# Patient Record
Sex: FEMALE | Race: WHITE | NOT HISPANIC OR LATINO | Employment: OTHER | URBAN - METROPOLITAN AREA
[De-identification: names, ages, dates, MRNs, and addresses within clinical notes are randomized per-mention and may not be internally consistent; named-entity substitution may affect disease eponyms.]

---

## 2023-02-18 PROBLEM — M43.16 SPONDYLOLISTHESIS AT L4-L5 LEVEL: Status: ACTIVE | Noted: 2023-02-18

## 2023-02-18 PROBLEM — D53.1 COMBINED B12 AND FOLATE DEFICIENCY ANEMIA: Status: ACTIVE | Noted: 2023-02-18

## 2023-02-18 PROBLEM — R42 VERTIGO: Status: ACTIVE | Noted: 2023-02-18

## 2023-02-18 PROBLEM — N39.0 UTI (URINARY TRACT INFECTION): Status: ACTIVE | Noted: 2023-02-18

## 2023-02-18 PROBLEM — H04.129 DRY EYE SYNDROME: Status: ACTIVE | Noted: 2023-02-18

## 2023-02-18 PROBLEM — G45.9 TIA (TRANSIENT ISCHEMIC ATTACK): Status: ACTIVE | Noted: 2023-02-18

## 2023-02-18 PROBLEM — M79.604 BILATERAL LEG PAIN: Status: ACTIVE | Noted: 2023-02-18

## 2023-02-18 PROBLEM — E66.9 CLASS 1 OBESITY WITH BODY MASS INDEX (BMI) OF 30.0 TO 30.9 IN ADULT: Status: ACTIVE | Noted: 2023-02-18

## 2023-02-18 PROBLEM — E55.9 MILD VITAMIN D DEFICIENCY: Status: ACTIVE | Noted: 2023-02-18

## 2023-02-18 PROBLEM — E66.811 CLASS 1 OBESITY WITH BODY MASS INDEX (BMI) OF 30.0 TO 30.9 IN ADULT: Status: ACTIVE | Noted: 2023-02-18

## 2023-02-18 PROBLEM — M79.7 FIBROMYALGIA: Status: ACTIVE | Noted: 2023-02-18

## 2023-02-18 PROBLEM — R53.81 MALAISE: Status: ACTIVE | Noted: 2023-02-18

## 2023-02-18 PROBLEM — E78.2 MIXED HYPERLIPIDEMIA: Status: ACTIVE | Noted: 2023-02-18

## 2023-02-18 PROBLEM — R30.0 DYSURIA: Status: ACTIVE | Noted: 2023-02-18

## 2023-02-18 PROBLEM — R09.02 HYPOXIA: Status: ACTIVE | Noted: 2023-02-18

## 2023-02-18 PROBLEM — R41.3 MEMORY LOSS: Status: ACTIVE | Noted: 2023-02-18

## 2023-02-18 PROBLEM — F41.8 DEPRESSION WITH ANXIETY: Status: ACTIVE | Noted: 2023-02-18

## 2023-02-18 PROBLEM — K21.9 ESOPHAGEAL REFLUX: Status: ACTIVE | Noted: 2023-02-18

## 2023-02-18 PROBLEM — I10 HYPERTENSION: Status: ACTIVE | Noted: 2023-02-18

## 2023-02-18 PROBLEM — E53.8 VITAMIN B 12 DEFICIENCY: Status: ACTIVE | Noted: 2023-02-18

## 2023-02-18 PROBLEM — M48.00 SPINAL STENOSIS: Status: ACTIVE | Noted: 2023-02-18

## 2023-02-18 PROBLEM — G62.9 NEUROPATHY: Status: ACTIVE | Noted: 2023-02-18

## 2023-02-18 PROBLEM — D64.9 ANEMIA: Status: ACTIVE | Noted: 2023-02-18

## 2023-02-18 PROBLEM — M79.605 BILATERAL LEG PAIN: Status: ACTIVE | Noted: 2023-02-18

## 2023-02-18 PROBLEM — R26.81 UNSTEADY GAIT: Status: ACTIVE | Noted: 2023-02-18

## 2023-02-18 PROBLEM — I25.10 CAD (CORONARY ARTERY DISEASE): Status: ACTIVE | Noted: 2023-02-18

## 2023-02-18 PROBLEM — K59.09 OTHER CONSTIPATION: Status: ACTIVE | Noted: 2023-02-18

## 2023-02-18 PROBLEM — R73.9 HYPERGLYCEMIA: Status: ACTIVE | Noted: 2023-02-18

## 2023-02-18 PROBLEM — R51.9 HEADACHE, UNSPECIFIED HEADACHE TYPE: Status: ACTIVE | Noted: 2023-02-18

## 2023-02-18 RX ORDER — ACETAMINOPHEN AND CODEINE PHOSPHATE 300; 30 MG/1; MG/1
1 TABLET ORAL DAILY PRN
COMMUNITY
Start: 2022-08-11 | End: 2023-04-05 | Stop reason: SINTOL

## 2023-02-18 RX ORDER — CITALOPRAM 10 MG/1
10 TABLET ORAL DAILY
COMMUNITY
Start: 2021-09-08 | End: 2023-12-20 | Stop reason: WASHOUT

## 2023-02-18 RX ORDER — ESOMEPRAZOLE MAGNESIUM 40 MG/1
40 CAPSULE, DELAYED RELEASE ORAL DAILY
COMMUNITY
Start: 2014-06-13 | End: 2023-12-20 | Stop reason: WASHOUT

## 2023-02-18 RX ORDER — METOPROLOL TARTRATE 25 MG/1
25 TABLET, FILM COATED ORAL 2 TIMES DAILY
COMMUNITY
Start: 2014-06-30 | End: 2023-07-10 | Stop reason: SDUPTHER

## 2023-02-18 RX ORDER — PEN NEEDLE, DIABETIC 30 GX3/16"
NEEDLE, DISPOSABLE MISCELLANEOUS
COMMUNITY
Start: 2016-07-18

## 2023-02-18 RX ORDER — LOSARTAN POTASSIUM 100 MG/1
100 TABLET ORAL DAILY
COMMUNITY
Start: 2016-07-18 | End: 2023-12-20 | Stop reason: SDUPTHER

## 2023-02-18 RX ORDER — ASPIRIN 81 MG/1
81 TABLET ORAL DAILY
COMMUNITY
Start: 2020-07-02 | End: 2023-12-20 | Stop reason: SDUPTHER

## 2023-02-18 RX ORDER — CYANOCOBALAMIN 1000 UG/ML
1000 INJECTION, SOLUTION INTRAMUSCULAR; SUBCUTANEOUS
COMMUNITY
Start: 2016-07-18

## 2023-02-18 RX ORDER — FOLIC ACID 1 MG/1
1 TABLET ORAL DAILY
COMMUNITY
Start: 2022-05-02 | End: 2023-12-26 | Stop reason: SDUPTHER

## 2023-02-18 RX ORDER — ERGOCALCIFEROL 1.25 MG/1
1.25 CAPSULE ORAL
COMMUNITY
Start: 2014-02-24 | End: 2023-04-05 | Stop reason: ALTCHOICE

## 2023-02-18 RX ORDER — AMLODIPINE BESYLATE 2.5 MG/1
2.5 TABLET ORAL NIGHTLY
COMMUNITY
Start: 2021-11-10 | End: 2023-11-03

## 2023-02-18 RX ORDER — ATORVASTATIN CALCIUM 20 MG/1
20 TABLET, FILM COATED ORAL DAILY
COMMUNITY
Start: 2021-06-07 | End: 2023-03-30 | Stop reason: SDUPTHER

## 2023-02-18 RX ORDER — GABAPENTIN 100 MG/1
100 CAPSULE ORAL 2 TIMES DAILY
COMMUNITY
Start: 2017-10-24 | End: 2023-12-20 | Stop reason: SDUPTHER

## 2023-02-18 RX ORDER — POLYVINYL ALCOHOL 14 MG/ML
1-2 SOLUTION/ DROPS OPHTHALMIC 2 TIMES DAILY
COMMUNITY
Start: 2018-12-27 | End: 2023-12-20 | Stop reason: SDUPTHER

## 2023-02-18 RX ORDER — NEEDLES, FILTER 19GX1 1/2"
NEEDLE, DISPOSABLE MISCELLANEOUS
COMMUNITY

## 2023-02-18 RX ORDER — TRAZODONE HYDROCHLORIDE 50 MG/1
50 TABLET ORAL NIGHTLY
COMMUNITY
Start: 2014-08-07 | End: 2023-04-05 | Stop reason: ALTCHOICE

## 2023-02-18 RX ORDER — CLOPIDOGREL BISULFATE 75 MG/1
75 TABLET ORAL DAILY
COMMUNITY
Start: 2020-02-14 | End: 2023-07-10 | Stop reason: SDUPTHER

## 2023-02-18 RX ORDER — PANTOPRAZOLE SODIUM 40 MG/1
40 TABLET, DELAYED RELEASE ORAL DAILY
COMMUNITY
Start: 2020-02-14 | End: 2023-12-20 | Stop reason: SDUPTHER

## 2023-02-18 RX ORDER — SYRINGE-NEEDLE,INSULIN,0.5 ML 27GX1/2"
SYRINGE, EMPTY DISPOSABLE MISCELLANEOUS
COMMUNITY

## 2023-03-06 ENCOUNTER — APPOINTMENT (OUTPATIENT)
Dept: PRIMARY CARE | Facility: CLINIC | Age: 83
End: 2023-03-06
Payer: COMMERCIAL

## 2023-03-30 DIAGNOSIS — E78.2 MIXED HYPERLIPIDEMIA: Primary | ICD-10-CM

## 2023-03-30 RX ORDER — ATORVASTATIN CALCIUM 20 MG/1
20 TABLET, FILM COATED ORAL DAILY
Qty: 30 TABLET | Refills: 3 | Status: SHIPPED | OUTPATIENT
Start: 2023-03-30 | End: 2023-07-10 | Stop reason: SDUPTHER

## 2023-04-05 ENCOUNTER — OFFICE VISIT (OUTPATIENT)
Dept: PRIMARY CARE | Facility: CLINIC | Age: 83
End: 2023-04-05
Payer: COMMERCIAL

## 2023-04-05 VITALS
SYSTOLIC BLOOD PRESSURE: 146 MMHG | HEART RATE: 62 BPM | BODY MASS INDEX: 29.25 KG/M2 | TEMPERATURE: 97 F | RESPIRATION RATE: 16 BRPM | DIASTOLIC BLOOD PRESSURE: 76 MMHG | WEIGHT: 149 LBS | HEIGHT: 60 IN

## 2023-04-05 DIAGNOSIS — E53.8 VITAMIN B 12 DEFICIENCY: Primary | ICD-10-CM

## 2023-04-05 DIAGNOSIS — Z00.00 ROUTINE GENERAL MEDICAL EXAMINATION AT HEALTH CARE FACILITY: ICD-10-CM

## 2023-04-05 DIAGNOSIS — E78.2 MIXED HYPERLIPIDEMIA: ICD-10-CM

## 2023-04-05 DIAGNOSIS — D50.9 IRON DEFICIENCY ANEMIA, UNSPECIFIED IRON DEFICIENCY ANEMIA TYPE: ICD-10-CM

## 2023-04-05 DIAGNOSIS — R73.9 HYPERGLYCEMIA: ICD-10-CM

## 2023-04-05 DIAGNOSIS — E55.9 MILD VITAMIN D DEFICIENCY: ICD-10-CM

## 2023-04-05 DIAGNOSIS — D53.1 COMBINED B12 AND FOLATE DEFICIENCY ANEMIA: ICD-10-CM

## 2023-04-05 PROCEDURE — 85025 COMPLETE CBC W/AUTO DIFF WBC: CPT | Performed by: INTERNAL MEDICINE

## 2023-04-05 PROCEDURE — 3077F SYST BP >= 140 MM HG: CPT | Performed by: INTERNAL MEDICINE

## 2023-04-05 PROCEDURE — 1157F ADVNC CARE PLAN IN RCRD: CPT | Performed by: INTERNAL MEDICINE

## 2023-04-05 PROCEDURE — 80061 LIPID PANEL: CPT | Performed by: INTERNAL MEDICINE

## 2023-04-05 PROCEDURE — 1159F MED LIST DOCD IN RCRD: CPT | Performed by: INTERNAL MEDICINE

## 2023-04-05 PROCEDURE — 82306 VITAMIN D 25 HYDROXY: CPT | Performed by: INTERNAL MEDICINE

## 2023-04-05 PROCEDURE — 1036F TOBACCO NON-USER: CPT | Performed by: INTERNAL MEDICINE

## 2023-04-05 PROCEDURE — G0439 PPPS, SUBSEQ VISIT: HCPCS | Performed by: INTERNAL MEDICINE

## 2023-04-05 PROCEDURE — 1170F FXNL STATUS ASSESSED: CPT | Performed by: INTERNAL MEDICINE

## 2023-04-05 PROCEDURE — 36415 COLL VENOUS BLD VENIPUNCTURE: CPT | Performed by: INTERNAL MEDICINE

## 2023-04-05 PROCEDURE — 99213 OFFICE O/P EST LOW 20 MIN: CPT | Performed by: INTERNAL MEDICINE

## 2023-04-05 PROCEDURE — 3078F DIAST BP <80 MM HG: CPT | Performed by: INTERNAL MEDICINE

## 2023-04-05 PROCEDURE — 84443 ASSAY THYROID STIM HORMONE: CPT | Performed by: INTERNAL MEDICINE

## 2023-04-05 PROCEDURE — 80053 COMPREHEN METABOLIC PANEL: CPT | Performed by: INTERNAL MEDICINE

## 2023-04-05 PROCEDURE — 82746 ASSAY OF FOLIC ACID SERUM: CPT | Performed by: INTERNAL MEDICINE

## 2023-04-05 PROCEDURE — 82607 VITAMIN B-12: CPT | Performed by: INTERNAL MEDICINE

## 2023-04-05 PROCEDURE — 83036 HEMOGLOBIN GLYCOSYLATED A1C: CPT | Performed by: INTERNAL MEDICINE

## 2023-04-05 ASSESSMENT — ACTIVITIES OF DAILY LIVING (ADL)
NEEDS ASSISTANCE WITH FOOD: SET UP OF PLATE
GROOMING: NEEDS ASSISTANCE
JUDGMENT_ADEQUATE_SAFELY_COMPLETE_DAILY_ACTIVITIES: NO
HEARING - RIGHT EAR: FUNCTIONAL
GROCERY SHOPPING: TOTAL CARE
ADEQUATE_TO_COMPLETE_ADL: YES
USING TELEPHONE: NEEDS ASSISTANCE
HEARING - LEFT EAR: FUNCTIONAL
ADEQUATE_TO_COMPLETE_ADL: YES
JUDGMENT_ADEQUATE_SAFELY_COMPLETE_DAILY_ACTIVITIES: NO
FEEDING YOURSELF: INDEPENDENT
TOILETING: NEEDS ASSISTANCE
TAKING MEDICATION: TOTAL CARE
DRESSING YOURSELF: INDEPENDENT
DRESSING: INDEPENDENT
MANAGING FINANCES: TOTAL CARE
EATING: NEEDS ASSISTANCE
PREPARING MEALS: TOTAL CARE
BATHING: NEEDS ASSISTANCE
BATHING: NEEDS ASSISTANCE
WALKS IN HOME: INDEPENDENT
DOING HOUSEWORK: TOTAL CARE
PATIENT'S MEMORY ADEQUATE TO SAFELY COMPLETE DAILY ACTIVITIES?: NO
PILL BOX USED: YES
ASSISTIVE_DEVICE: OTHER (COMMENT);CANE
FEEDING: INDEPENDENT
USING TRANSPORTATION: TOTAL CARE
TOILETING: NEEDS ASSISTANCE

## 2023-04-05 ASSESSMENT — PATIENT HEALTH QUESTIONNAIRE - PHQ9
SUM OF ALL RESPONSES TO PHQ9 QUESTIONS 1 AND 2: 2
2. FEELING DOWN, DEPRESSED OR HOPELESS: SEVERAL DAYS
10. IF YOU CHECKED OFF ANY PROBLEMS, HOW DIFFICULT HAVE THESE PROBLEMS MADE IT FOR YOU TO DO YOUR WORK, TAKE CARE OF THINGS AT HOME, OR GET ALONG WITH OTHER PEOPLE: SOMEWHAT DIFFICULT
1. LITTLE INTEREST OR PLEASURE IN DOING THINGS: SEVERAL DAYS

## 2023-04-05 ASSESSMENT — ENCOUNTER SYMPTOMS
ARTHRALGIAS: 1
COUGH: 0
SLEEP DISTURBANCE: 1
DIZZINESS: 1
CONSTIPATION: 0
LOSS OF SENSATION IN FEET: 0
NECK PAIN: 1
ACTIVITY CHANGE: 1
WEAKNESS: 1
BACK PAIN: 1
APPETITE CHANGE: 1
HEADACHES: 1
NERVOUS/ANXIOUS: 1
MYALGIAS: 1
CHEST TIGHTNESS: 1
SHORTNESS OF BREATH: 1
DEPRESSION: 1
OCCASIONAL FEELINGS OF UNSTEADINESS: 1

## 2023-04-05 ASSESSMENT — ANXIETY QUESTIONNAIRES
6. BECOMING EASILY ANNOYED OR IRRITABLE: SEVERAL DAYS
4. TROUBLE RELAXING: NOT AT ALL
1. FEELING NERVOUS, ANXIOUS, OR ON EDGE: NOT AT ALL
2. NOT BEING ABLE TO STOP OR CONTROL WORRYING: NOT AT ALL
GAD7 TOTAL SCORE: 3
3. WORRYING TOO MUCH ABOUT DIFFERENT THINGS: NOT AT ALL
7. FEELING AFRAID AS IF SOMETHING AWFUL MIGHT HAPPEN: SEVERAL DAYS
5. BEING SO RESTLESS THAT IT IS HARD TO SIT STILL: SEVERAL DAYS

## 2023-04-05 ASSESSMENT — PAIN SCALES - GENERAL: PAINLEVEL: 8

## 2023-04-05 ASSESSMENT — COLUMBIA-SUICIDE SEVERITY RATING SCALE - C-SSRS
1. IN THE PAST MONTH, HAVE YOU WISHED YOU WERE DEAD OR WISHED YOU COULD GO TO SLEEP AND NOT WAKE UP?: NO
2. HAVE YOU ACTUALLY HAD ANY THOUGHTS OF KILLING YOURSELF?: NO
6. HAVE YOU EVER DONE ANYTHING, STARTED TO DO ANYTHING, OR PREPARED TO DO ANYTHING TO END YOUR LIFE?: NO

## 2023-04-05 NOTE — PROGRESS NOTES
Subjective     Patient is presented for follow up.  Has malaise, complaining of low back pain.  Weak.  Has insomnia.  Patient is seen with her  present.    HPI    This is an 82 years old Botswanan speaking lady with medical history significant for hypertension, hyper lipidemia, vitamin D. deficiency, degenerative joint disease, low back pain, severe depression with anxiety, headaches, arthralgia, insomnia, memory loss, low back pain, fibromyalgia, gastroesophageal reflux disease, syncopal episode, H of R sided pleural effusion, s/p hospitalization for Acute NSTMI 1/2020 due to occlusion of LAD with NATALIE placement , s/p bilateral cataract surgery, S/p hospitalization for COVID -19 illness 7/6/2022 to 7/9/2022.  Patient  is presented for  evaluation and treatment of the above complaints.  She is in the room with her .  According to her, patient has been weak.  Has dizziness, lightheadedness episodes.  Has low back pain, arthralgia.  Has insomnia.  Taking medications as directed.  Has occasional chest pain.  Stated, that blood pressure is stable now.   Patient has issues with memory.  Forgetful.     Has occasional constipations.  Using bedside commode.  Discussed with patient about exercise, not so active.     Has been having low energy, fatigue and malaise.  Stated, that has been having some chest discomfort.  Followed by Dr Kamari Rogers at [facility].     Followed by the psychiatry .    Review of Systems   Constitutional:  Positive for activity change and appetite change.   Respiratory:  Positive for chest tightness and shortness of breath. Negative for cough.    Cardiovascular:  Negative for leg swelling.   Gastrointestinal:  Negative for constipation.   Musculoskeletal:  Positive for arthralgias, back pain, myalgias and neck pain.   Neurological:  Positive for dizziness, weakness and headaches.   Psychiatric/Behavioral:  Positive for sleep disturbance. The patient is nervous/anxious.        Objective         Vitals:    04/05/23 1151   BP: 146/76   Pulse: 62   Resp: 16   Temp: 36.1 °C (97 °F)        Physical Exam  Constitutional:       Appearance: Normal appearance.   HENT:      Head: Normocephalic and atraumatic.      Nose: Nose normal.      Mouth/Throat:      Mouth: Mucous membranes are moist.   Eyes:      Extraocular Movements: Extraocular movements intact.      Pupils: Pupils are equal, round, and reactive to light.   Cardiovascular:      Rate and Rhythm: Normal rate and regular rhythm.   Pulmonary:      Effort: Pulmonary effort is normal.      Breath sounds: Normal breath sounds.   Abdominal:      Palpations: Abdomen is soft.   Musculoskeletal:      Cervical back: Normal range of motion.   Skin:     General: Skin is warm and dry.   Neurological:      General: No focal deficit present.      Mental Status: She is alert and oriented to person, place, and time.      Motor: Weakness present.      Gait: Gait abnormal.   Psychiatric:         Mood and Affect: Mood normal.         Behavior: Behavior normal.       Diagnoses and all orders for this visit:  Vitamin B 12 deficiency (Primary)  -     Vitamin B12  Combined B12 and folate deficiency anemia  -     Folate  -     Vitamin B12  Hyperglycemia  -     Hemoglobin A1C  Mixed hyperlipidemia  -     Comprehensive Metabolic Panel  -     Lipid Panel  -     Thyroid Stimulating Hormone  Mild vitamin D deficiency  -     Vitamin D 1,25 Dihydroxy  Iron deficiency anemia, unspecified iron deficiency anemia type  -     CBC     -Fibromyalgia.  Generalized pain.  Patient was advised about importance of exercise, moving.    -Anxiety, depression.  Taking escitalopram, trazodone.  Follow-up with psychiatry.  Stable.    -H of COVID -19 illness, s/p hospitalization 7/6/2022 to 7/9/2022.  Patient was admitted to the main hospital with acute respiratory failure due to COVID-19, episodes of diarrhea, shortness of breath, UTI, acute kidney injury, dehydrated.  Patient was treated with  remdesivir, dexamethasone.  She is unvaccinated.     -Degenerative joint disease.  Low back pain.  Take Tylenol as needed.  Continue to take gabapentin 100 mg twice a day.    -HTN.  WEll controlled now.  Currently taking amlodipine, losartan metoprolol.  Avoid salt, exercise.    -CAD.  H of Acute NSTEMI due to 90 % LAD occlusion.  Followed by Dr Padilla at [facility].  Last 2D Echo 1/2020 normal left ventricular systolic function..  Grade 1 left ventricular diastolic dysfunction.  Cardiac cath 1/2020 severe stenosis of the proximal LAD.  Mild diffuse coronary disease of LCx and RCA.  S/p NATALIE placement 1/2020.  Stable now, no chest pain.  Optimize medical therapy.    - Ascending aortic aneurysm.  Surveillance CT scan.    -Headaches.  Take gabapentin 100 mg twice a day.    - Stable orbital meningioma.  Stable.    =Mild Vit D deficiency.  Take Vit D as directed.     - Vitamin B-12 deficiency.  Continue to take vitamin B 12 monthly.    - Health maintenance.  Medicare wellness exam is  today.  Declined vaccinations.    -Overweight with BMI 29.10  Keep your weight, continue well-balanced diet.  Ideal BMI is 25.    -Hyperglycemia.  We will obtain hemoglobin A1c.  Avoid sweets, carbohydrates.     Follow up in 1 month.            Romana Morse MD   Patient was identified as a fall risk. Risk prevention instructions provided.

## 2023-04-05 NOTE — PATIENT INSTRUCTIONS

## 2023-04-10 RX ORDER — ERGOCALCIFEROL 1.25 MG/1
50000 CAPSULE ORAL
Qty: 4 CAPSULE | Refills: 1 | Status: SHIPPED | OUTPATIENT
Start: 2023-04-10 | End: 2023-06-05

## 2023-04-10 NOTE — PROGRESS NOTES
D/w patient about BW results.  Take Vit D 98490 weekly.  Watch your carbohydrate intake.  Hemoglobin A1c 6.1.  Current  therapy with atorvastatin.

## 2023-08-11 PROCEDURE — G0179 MD RECERTIFICATION HHA PT: HCPCS | Performed by: INTERNAL MEDICINE

## 2023-10-17 DIAGNOSIS — I25.10 CORONARY ARTERY DISEASE INVOLVING NATIVE CORONARY ARTERY OF NATIVE HEART, UNSPECIFIED WHETHER ANGINA PRESENT: ICD-10-CM

## 2023-10-17 RX ORDER — CLOPIDOGREL BISULFATE 75 MG/1
75 TABLET ORAL DAILY
Qty: 90 TABLET | Refills: 0 | Status: SHIPPED | OUTPATIENT
Start: 2023-10-17 | End: 2023-12-20 | Stop reason: SDUPTHER

## 2023-11-02 DIAGNOSIS — I10 PRIMARY HYPERTENSION: Primary | ICD-10-CM

## 2023-11-03 RX ORDER — AMLODIPINE BESYLATE 2.5 MG/1
2.5 TABLET ORAL NIGHTLY
Qty: 90 TABLET | Refills: 3 | Status: SHIPPED | OUTPATIENT
Start: 2023-11-03 | End: 2023-12-20 | Stop reason: SDUPTHER

## 2023-12-20 ENCOUNTER — OFFICE VISIT (OUTPATIENT)
Dept: PRIMARY CARE | Facility: CLINIC | Age: 83
End: 2023-12-20
Payer: COMMERCIAL

## 2023-12-20 VITALS
RESPIRATION RATE: 16 BRPM | WEIGHT: 144 LBS | TEMPERATURE: 96.9 F | HEART RATE: 62 BPM | HEIGHT: 60 IN | SYSTOLIC BLOOD PRESSURE: 138 MMHG | BODY MASS INDEX: 28.27 KG/M2 | DIASTOLIC BLOOD PRESSURE: 76 MMHG

## 2023-12-20 DIAGNOSIS — M79.7 FIBROMYALGIA: ICD-10-CM

## 2023-12-20 DIAGNOSIS — E53.8 VITAMIN B 12 DEFICIENCY: ICD-10-CM

## 2023-12-20 DIAGNOSIS — E55.9 MILD VITAMIN D DEFICIENCY: ICD-10-CM

## 2023-12-20 DIAGNOSIS — Z78.0 ASYMPTOMATIC MENOPAUSAL STATE: Primary | ICD-10-CM

## 2023-12-20 DIAGNOSIS — H04.123 DRY EYE SYNDROME OF BOTH EYES: ICD-10-CM

## 2023-12-20 DIAGNOSIS — E78.2 MIXED HYPERLIPIDEMIA: ICD-10-CM

## 2023-12-20 DIAGNOSIS — I25.10 CORONARY ARTERY DISEASE INVOLVING NATIVE CORONARY ARTERY OF NATIVE HEART, UNSPECIFIED WHETHER ANGINA PRESENT: ICD-10-CM

## 2023-12-20 DIAGNOSIS — K21.9 GASTROESOPHAGEAL REFLUX DISEASE, UNSPECIFIED WHETHER ESOPHAGITIS PRESENT: ICD-10-CM

## 2023-12-20 DIAGNOSIS — R53.81 MALAISE AND FATIGUE: ICD-10-CM

## 2023-12-20 DIAGNOSIS — D50.9 IRON DEFICIENCY ANEMIA, UNSPECIFIED IRON DEFICIENCY ANEMIA TYPE: ICD-10-CM

## 2023-12-20 DIAGNOSIS — R53.83 MALAISE AND FATIGUE: ICD-10-CM

## 2023-12-20 DIAGNOSIS — E53.8 FOLATE DEFICIENCY: ICD-10-CM

## 2023-12-20 DIAGNOSIS — R73.9 HYPERGLYCEMIA: ICD-10-CM

## 2023-12-20 DIAGNOSIS — I10 PRIMARY HYPERTENSION: ICD-10-CM

## 2023-12-20 PROCEDURE — 1036F TOBACCO NON-USER: CPT | Performed by: INTERNAL MEDICINE

## 2023-12-20 PROCEDURE — 82746 ASSAY OF FOLIC ACID SERUM: CPT | Performed by: INTERNAL MEDICINE

## 2023-12-20 PROCEDURE — 82306 VITAMIN D 25 HYDROXY: CPT | Performed by: INTERNAL MEDICINE

## 2023-12-20 PROCEDURE — 1160F RVW MEDS BY RX/DR IN RCRD: CPT | Performed by: INTERNAL MEDICINE

## 2023-12-20 PROCEDURE — 3075F SYST BP GE 130 - 139MM HG: CPT | Performed by: INTERNAL MEDICINE

## 2023-12-20 PROCEDURE — 80061 LIPID PANEL: CPT | Performed by: INTERNAL MEDICINE

## 2023-12-20 PROCEDURE — 1159F MED LIST DOCD IN RCRD: CPT | Performed by: INTERNAL MEDICINE

## 2023-12-20 PROCEDURE — 85025 COMPLETE CBC W/AUTO DIFF WBC: CPT | Performed by: INTERNAL MEDICINE

## 2023-12-20 PROCEDURE — 1125F AMNT PAIN NOTED PAIN PRSNT: CPT | Performed by: INTERNAL MEDICINE

## 2023-12-20 PROCEDURE — 82607 VITAMIN B-12: CPT | Performed by: INTERNAL MEDICINE

## 2023-12-20 PROCEDURE — 84443 ASSAY THYROID STIM HORMONE: CPT | Performed by: INTERNAL MEDICINE

## 2023-12-20 PROCEDURE — 3078F DIAST BP <80 MM HG: CPT | Performed by: INTERNAL MEDICINE

## 2023-12-20 PROCEDURE — 83036 HEMOGLOBIN GLYCOSYLATED A1C: CPT | Performed by: INTERNAL MEDICINE

## 2023-12-20 PROCEDURE — 80053 COMPREHEN METABOLIC PANEL: CPT | Performed by: INTERNAL MEDICINE

## 2023-12-20 PROCEDURE — 99213 OFFICE O/P EST LOW 20 MIN: CPT | Performed by: INTERNAL MEDICINE

## 2023-12-20 RX ORDER — ASPIRIN 81 MG/1
81 TABLET ORAL DAILY
Qty: 90 TABLET | Refills: 2 | Status: SHIPPED | OUTPATIENT
Start: 2023-12-20 | End: 2024-03-19

## 2023-12-20 RX ORDER — ALPRAZOLAM 0.25 MG/1
0.12 TABLET ORAL NIGHTLY
COMMUNITY
Start: 2023-08-04

## 2023-12-20 RX ORDER — AMLODIPINE BESYLATE 2.5 MG/1
2.5 TABLET ORAL NIGHTLY
Qty: 90 TABLET | Refills: 3 | Status: SHIPPED | OUTPATIENT
Start: 2023-12-20

## 2023-12-20 RX ORDER — PANTOPRAZOLE SODIUM 40 MG/1
40 TABLET, DELAYED RELEASE ORAL DAILY
Qty: 90 TABLET | Refills: 3 | Status: SHIPPED | OUTPATIENT
Start: 2023-12-20 | End: 2024-04-04 | Stop reason: SDUPTHER

## 2023-12-20 RX ORDER — ATORVASTATIN CALCIUM 20 MG/1
20 TABLET, FILM COATED ORAL DAILY
Qty: 90 TABLET | Refills: 3 | Status: SHIPPED | OUTPATIENT
Start: 2023-12-20 | End: 2023-12-26 | Stop reason: WASHOUT

## 2023-12-20 RX ORDER — METOPROLOL TARTRATE 25 MG/1
25 TABLET, FILM COATED ORAL 2 TIMES DAILY
Qty: 180 TABLET | Refills: 3 | Status: SHIPPED | OUTPATIENT
Start: 2023-12-20

## 2023-12-20 RX ORDER — LOSARTAN POTASSIUM 100 MG/1
100 TABLET ORAL DAILY
Qty: 90 TABLET | Refills: 3 | Status: SHIPPED | OUTPATIENT
Start: 2023-12-20 | End: 2024-04-04 | Stop reason: SDUPTHER

## 2023-12-20 RX ORDER — GABAPENTIN 100 MG/1
100 CAPSULE ORAL 2 TIMES DAILY
Qty: 180 CAPSULE | Refills: 3 | Status: SHIPPED | OUTPATIENT
Start: 2023-12-20 | End: 2024-03-19

## 2023-12-20 RX ORDER — SERTRALINE HYDROCHLORIDE 25 MG/1
25 TABLET, FILM COATED ORAL DAILY
COMMUNITY
Start: 2023-07-30

## 2023-12-20 RX ORDER — CLOPIDOGREL BISULFATE 75 MG/1
75 TABLET ORAL DAILY
Qty: 90 TABLET | Refills: 3 | Status: SHIPPED | OUTPATIENT
Start: 2023-12-20

## 2023-12-20 RX ORDER — POLYVINYL ALCOHOL 14 MG/ML
1-2 SOLUTION/ DROPS OPHTHALMIC 2 TIMES DAILY
Qty: 5 ML | Refills: 3 | Status: SHIPPED | OUTPATIENT
Start: 2023-12-20

## 2023-12-20 ASSESSMENT — ENCOUNTER SYMPTOMS
HEADACHES: 1
ARTHRALGIAS: 1
DIZZINESS: 1
BACK PAIN: 1
NERVOUS/ANXIOUS: 1
UNEXPECTED WEIGHT CHANGE: 1
SLEEP DISTURBANCE: 1
LIGHT-HEADEDNESS: 1
FATIGUE: 1
APPETITE CHANGE: 1
ACTIVITY CHANGE: 1

## 2023-12-20 ASSESSMENT — PAIN SCALES - GENERAL: PAINLEVEL: 8

## 2023-12-20 NOTE — PROGRESS NOTES
Subjective    Ragini Butler is a 83 y.o. female who presents for  follow up.  Weak.  Has poor sleep.  Has continuous headaches.    HPI    This is an 83 years old Turkmen speaking lady with medical history significant for hypertension, hyper lipidemia, vitamin D. deficiency, degenerative joint disease, low back pain, severe depression with anxiety, headaches, arthralgia, insomnia, memory loss, low back pain, fibromyalgia, gastroesophageal reflux disease, syncopal episode, H of R sided pleural effusion, s/p hospitalization for Acute NSTMI 1/2020 due to occlusion of LAD with NATALIE placement , s/p bilateral cataract surgery, S/p hospitalization for COVID -19 illness 7/6/2022 to 7/9/2022.  Patient  is presented for  evaluation and treatment of the above complaints.  She is in the room with her .  Has headaches.  According to her, patient has been weak.  Has dizziness, lightheadedness episodes.  Has low back pain, arthralgia.  Has insomnia.  Taking medications as directed.  Has occasional chest pain.  Stated, that blood pressure is stable now.   Patient has issues with memory.  Forgetful.     Has occasional constipations.  Using bedside commode.  Discussed with patient about exercise, not so active.     Has been having low energy, fatigue and malaise.  Stated, that has been having some chest discomfort.  Followed by Dr Kamari Rogers at [facility].     Followed by the psychiatry .     Review of Systems   Constitutional:  Positive for activity change, appetite change, fatigue and unexpected weight change.   Musculoskeletal:  Positive for arthralgias and back pain.   Neurological:  Positive for dizziness, light-headedness and headaches.   Psychiatric/Behavioral:  Positive for sleep disturbance. The patient is nervous/anxious.        Objective        Vitals:    12/20/23 1322   BP: 138/76   Pulse: 62   Resp: 16   Temp: 36.1 °C (96.9 °F)        Physical Exam  HENT:      Head: Normocephalic.      Nose: Nose normal.       Mouth/Throat:      Mouth: Mucous membranes are dry.   Cardiovascular:      Pulses: Normal pulses.   Pulmonary:      Effort: Pulmonary effort is normal.      Breath sounds: Normal breath sounds.   Musculoskeletal:         General: Normal range of motion.   Neurological:      General: No focal deficit present.      Mental Status: She is alert. Mental status is at baseline.   Psychiatric:         Mood and Affect: Mood normal.         Thought Content: Thought content normal.       Diagnoses and all orders for this visit:  Asymptomatic menopausal state (Primary)  -     XR DEXA bone density; Future  Mild vitamin D deficiency  -     Vitamin D 25-Hydroxy,Total (for eval of Vitamin D levels)  Hyperglycemia  -     Hemoglobin A1C  Vitamin B 12 deficiency  -     Vitamin B12  Iron deficiency anemia, unspecified iron deficiency anemia type  -     CBC  Mixed hyperlipidemia  -     Comprehensive Metabolic Panel  -     Lipid Panel  -     atorvastatin (Lipitor) 20 mg tablet; Take 1 tablet (20 mg) by mouth once daily.  -     aspirin 81 mg EC tablet; Take 1 tablet (81 mg) by mouth once daily.  Folate deficiency  -     Folate  Malaise and fatigue  -     Thyroid Stimulating Hormone  Primary hypertension  -     metoprolol tartrate (Lopressor) 25 mg tablet; Take 1 tablet (25 mg) by mouth 2 times a day.  -     losartan (Cozaar) 100 mg tablet; Take 1 tablet (100 mg) by mouth once daily.  -     amLODIPine (Norvasc) 2.5 mg tablet; Take 1 tablet (2.5 mg) by mouth once daily at bedtime.  Gastroesophageal reflux disease, unspecified whether esophagitis present  -     pantoprazole (ProtoNix) 40 mg EC tablet; Take 1 tablet (40 mg) by mouth once daily.  Dry eye syndrome of both eyes  -     polyvinyl alcohol (Liquifilm Tears) 1.4 % ophthalmic solution; Administer 1-2 drops into both eyes 2 times a day.  Fibromyalgia  -     gabapentin (Neurontin) 100 mg capsule; Take 1 capsule (100 mg) by mouth 2 times a day.  Coronary artery disease involving native  coronary artery of native heart, unspecified whether angina present  -     clopidogrel (Plavix) 75 mg tablet; Take 1 tablet (75 mg) by mouth once daily.     -Anxiety, depression.  Taking  Sertraline, Xanax.  Follow-up with psychiatry.  Stable.    Fibromyalgia.  Generalized pain.  Patient was advised about importance of exercise, moving.     -H of COVID -19 illness, s/p hospitalization 7/6/2022 to 7/9/2022.  Patient was admitted to the main hospital with acute respiratory failure due to COVID-19, episodes of diarrhea, shortness of breath, UTI, acute kidney injury, dehydrated.  Patient was treated with remdesivir, dexamethasone.  She is unvaccinated.     -Degenerative joint disease.  Low back pain.  Take Tylenol as needed.  Continue to take gabapentin 100 mg twice a day.     -HTN.  WEll controlled now.  Currently taking amlodipine, losartan metoprolol.  Avoid salt, exercise.     -CAD.  H of Acute NSTEMI due to 90 % LAD occlusion.  Followed by Dr Padilla at [facility].  Last 2D Echo 1/2020 normal left ventricular systolic function..  Grade 1 left ventricular diastolic dysfunction.  Cardiac cath 1/2020 severe stenosis of the proximal LAD.  Mild diffuse coronary disease of LCx and RCA.  S/p NATALIE placement 1/2020.  Stable now, no chest pain.  Optimize medical therapy.     - Ascending aortic aneurysm.  Surveillance CT scan.     -Headaches.  Take gabapentin 100 mg twice a day.     - Stable orbital meningioma.  Stable.     =Mild Vit D deficiency.  Take Vit D as directed.      - Vitamin B-12 deficiency.  Continue to take vitamin B 12 monthly.     - Health maintenance.  Medicare wellness exam is  today.  Declined vaccinations.     -Overweight with BMI 28.12.  Keep your weight, continue well-balanced diet.  Ideal BMI is 25.     -Hyperglycemia.  We will obtain hemoglobin A1c.  Avoid sweets, carbohydrates.      Romana Morse MD

## 2023-12-26 DIAGNOSIS — E55.9 MILD VITAMIN D DEFICIENCY: ICD-10-CM

## 2023-12-26 DIAGNOSIS — E53.8 FOLIC ACID DEFICIENCY: Primary | ICD-10-CM

## 2023-12-26 DIAGNOSIS — E78.2 MIXED HYPERLIPIDEMIA: ICD-10-CM

## 2023-12-26 RX ORDER — ERGOCALCIFEROL 1.25 MG/1
50000 CAPSULE ORAL
Qty: 4 CAPSULE | Refills: 2 | Status: SHIPPED | OUTPATIENT
Start: 2023-12-26 | End: 2024-03-19

## 2023-12-26 RX ORDER — FOLIC ACID 1 MG/1
1 TABLET ORAL DAILY
Qty: 90 TABLET | Refills: 3 | Status: SHIPPED | OUTPATIENT
Start: 2023-12-26 | End: 2023-12-29 | Stop reason: SDUPTHER

## 2023-12-26 RX ORDER — ATORVASTATIN CALCIUM 40 MG/1
40 TABLET, FILM COATED ORAL DAILY
Qty: 90 TABLET | Refills: 2 | Status: SHIPPED | OUTPATIENT
Start: 2023-12-26 | End: 2024-04-04 | Stop reason: WASHOUT

## 2023-12-29 ENCOUNTER — TELEPHONE (OUTPATIENT)
Dept: PRIMARY CARE | Facility: CLINIC | Age: 83
End: 2023-12-29
Payer: COMMERCIAL

## 2023-12-29 DIAGNOSIS — E53.8 FOLATE DEFICIENCY: Primary | ICD-10-CM

## 2023-12-29 DIAGNOSIS — E78.2 MIXED HYPERLIPIDEMIA: ICD-10-CM

## 2023-12-29 DIAGNOSIS — E53.8 FOLIC ACID DEFICIENCY: ICD-10-CM

## 2023-12-29 RX ORDER — FOLIC ACID 1 MG/1
1 TABLET ORAL DAILY
Qty: 90 TABLET | Refills: 3 | Status: SHIPPED | OUTPATIENT
Start: 2023-12-29 | End: 2024-04-04 | Stop reason: SDUPTHER

## 2023-12-29 RX ORDER — ROSUVASTATIN CALCIUM 20 MG/1
20 TABLET, COATED ORAL DAILY
Qty: 30 TABLET | Refills: 11 | Status: SHIPPED | OUTPATIENT
Start: 2023-12-29 | End: 2024-12-28

## 2023-12-29 NOTE — TELEPHONE ENCOUNTER
Patient has low Folic acid.  Start on Folic acid.  Cholesterol is 231, .  Change to Crestor, Lipitor is not working.  Keep Mediterranean diet., exercise.

## 2024-02-07 PROCEDURE — G0179 MD RECERTIFICATION HHA PT: HCPCS | Performed by: INTERNAL MEDICINE

## 2024-04-04 ENCOUNTER — TELEMEDICINE (OUTPATIENT)
Dept: PRIMARY CARE | Facility: CLINIC | Age: 84
End: 2024-04-04
Payer: COMMERCIAL

## 2024-04-04 DIAGNOSIS — K21.9 GASTROESOPHAGEAL REFLUX DISEASE, UNSPECIFIED WHETHER ESOPHAGITIS PRESENT: ICD-10-CM

## 2024-04-04 DIAGNOSIS — I10 PRIMARY HYPERTENSION: ICD-10-CM

## 2024-04-04 DIAGNOSIS — E53.8 FOLIC ACID DEFICIENCY: ICD-10-CM

## 2024-04-04 PROCEDURE — 1157F ADVNC CARE PLAN IN RCRD: CPT | Performed by: INTERNAL MEDICINE

## 2024-04-04 PROCEDURE — 1159F MED LIST DOCD IN RCRD: CPT | Performed by: INTERNAL MEDICINE

## 2024-04-04 PROCEDURE — 99442 PR PHYS/QHP TELEPHONE EVALUATION 11-20 MIN: CPT | Performed by: INTERNAL MEDICINE

## 2024-04-04 RX ORDER — LOSARTAN POTASSIUM 100 MG/1
100 TABLET ORAL DAILY
Qty: 90 TABLET | Refills: 3 | Status: SHIPPED | OUTPATIENT
Start: 2024-04-04 | End: 2024-07-03

## 2024-04-04 RX ORDER — PANTOPRAZOLE SODIUM 40 MG/1
40 TABLET, DELAYED RELEASE ORAL DAILY
Qty: 90 TABLET | Refills: 3 | Status: SHIPPED | OUTPATIENT
Start: 2024-04-04 | End: 2024-07-03

## 2024-04-04 RX ORDER — FOLIC ACID 1 MG/1
1 TABLET ORAL DAILY
Qty: 90 TABLET | Refills: 3 | Status: SHIPPED | OUTPATIENT
Start: 2024-04-04 | End: 2024-07-03

## 2024-04-04 ASSESSMENT — ENCOUNTER SYMPTOMS
BACK PAIN: 1
NERVOUS/ANXIOUS: 1
APPETITE CHANGE: 1
SLEEP DISTURBANCE: 1
ARTHRALGIAS: 1
FATIGUE: 1
HEADACHES: 1
LIGHT-HEADEDNESS: 1
ACTIVITY CHANGE: 1

## 2024-04-04 NOTE — PROGRESS NOTES
Subjective    Ragini Butler is a 83 y.o. female who is evaluated [facility].    Has elevated Blood pressure  Weak.  Patient is complaining of headaches.  History is obtained with help of her granddaughter.  In need for refills.    HPI    This is an 83 years old Vietnamese speaking lady with medical history significant for hypertension, hyper lipidemia, vitamin D. deficiency, degenerative joint disease, low back pain, severe depression with anxiety, headaches, arthralgia, insomnia, memory loss, low back pain, fibromyalgia, gastroesophageal reflux disease, syncopal episode, H of R sided pleural effusion, s/p hospitalization for Acute NSTMI 1/2020 due to occlusion of LAD with NATALIE placement , s/p bilateral cataract surgery, S/p hospitalization for COVID -19 illness 7/6/2022 to 7/9/2022.  Patient is evaluated via Telemedicine.  History is obtained with help of her granddaughter.  Has been having headaches.  According to her, patient has been weak.  Has dizziness, lightheadedness episodes.  Has low back pain, arthralgia.  Has insomnia.  Taking medications as directed.  Has occasional chest pain.  Stated, that blood pressure is stable now.   Patient has issues with memory.  Forgetful.     Has occasional constipations.  Using bedside commode.  Discussed with patient about exercise, not so active.     Has been having low energy, fatigue and malaise.  Stated, that has been having some chest discomfort.  Followed by Dr Kamari Rogers at [facility].     Followed by the psychiatry .     Review of Systems   Constitutional:  Positive for activity change, appetite change and fatigue.   Cardiovascular:  Negative for chest pain and leg swelling.   Musculoskeletal:  Positive for arthralgias, back pain and gait problem.   Neurological:  Positive for light-headedness and headaches.   Psychiatric/Behavioral:  Positive for sleep disturbance. The patient is nervous/anxious.        Objective      There were no vitals filed for this visit.      Physical Exam  Diagnoses and all orders for this visit:  Gastroesophageal reflux disease, unspecified whether esophagitis present  -     pantoprazole (ProtoNix) 40 mg EC tablet; Take 1 tablet (40 mg) by mouth once daily.  Primary hypertension  -     losartan (Cozaar) 100 mg tablet; Take 1 tablet (100 mg) by mouth once daily.  Folic acid deficiency  -     folic acid (Folvite) 1 mg tablet; Take 1 tablet (1 mg) by mouth once daily.       -Headaches.  Take gabapentin 100 mg twice a day.  Return to repeat MRI.     - Stable orbital meningioma.  Stable.  Due to MRI.     Anxiety, depression.  Taking  Sertraline, Xanax.  Follow-up with psychiatry.  Stable.     Fibromyalgia.  Generalized pain.  Patient was advised about importance of exercise, moving.     -H of COVID -19 illness, s/p hospitalization 7/6/2022 to 7/9/2022.  Patient was admitted to the main hospital with acute respiratory failure due to COVID-19, episodes of diarrhea, shortness of breath, UTI, acute kidney injury, dehydrated.  Patient was treated with remdesivir, dexamethasone.  She is unvaccinated.     -Degenerative joint disease.  Low back pain.  Take Tylenol as needed.  Continue to take gabapentin 100 mg twice a day.     -HTN.  WEll controlled now.  Currently taking amlodipine, losartan metoprolol.  Avoid salt, exercise.     -CAD.  H of Acute NSTEMI due to 90 % LAD occlusion.  Followed by Dr Padilla at [facility].  Last 2D Echo 1/2020 normal left ventricular systolic function..  Grade 1 left ventricular diastolic dysfunction.  Cardiac cath 1/2020 severe stenosis of the proximal LAD.  Mild diffuse coronary disease of LCx and RCA.  S/p NATALIE placement 1/2020.  Stable now, no chest pain.  Optimize medical therapy.     - Ascending aortic aneurysm.  Surveillance CT scan.    Mild Vit D deficiency.  Take Vit D as directed.      - Vitamin B-12 deficiency.  Continue to take vitamin B 12 monthly.     - Health maintenance.  Medicare wellness exam is up to date.  Declined  vaccinations.     -Overweight with BMI 28.12.  Keep your weight, continue well-balanced diet.  Ideal BMI is 25.     -Hyperglycemia.  We will obtain hemoglobin A1c.  Avoid sweets, carbohydrates.    Total time spent with patient is greater than 13 minutes, more than 50% of the time is spent in direct patient telemedicine, patient consultation and coordination of care.  Patient voiced a full understanding of all treatment plans.  All patient questions has been answered to patient's satisfaction.  This visit was completed via telephone due to the restrictions of the COVID -19 pandemic.  All issues as above were discussed and addressed, but no physical exam was performed.  If it was felt, that the patient should be evaluated in clinic, then they were directed there.  The patient verbally consented to visit.        Romana Morse MD

## 2024-04-07 PROCEDURE — G0179 MD RECERTIFICATION HHA PT: HCPCS | Performed by: INTERNAL MEDICINE

## 2024-06-06 PROCEDURE — G0180 MD CERTIFICATION HHA PATIENT: HCPCS | Performed by: INTERNAL MEDICINE

## 2024-08-05 PROCEDURE — G0179 MD RECERTIFICATION HHA PT: HCPCS | Performed by: INTERNAL MEDICINE

## 2024-09-11 ENCOUNTER — APPOINTMENT (OUTPATIENT)
Dept: PRIMARY CARE | Facility: CLINIC | Age: 84
End: 2024-09-11
Payer: COMMERCIAL

## 2024-09-11 VITALS
HEIGHT: 60 IN | TEMPERATURE: 97.5 F | RESPIRATION RATE: 16 BRPM | SYSTOLIC BLOOD PRESSURE: 122 MMHG | DIASTOLIC BLOOD PRESSURE: 78 MMHG | BODY MASS INDEX: 27.68 KG/M2 | HEART RATE: 64 BPM | WEIGHT: 141 LBS

## 2024-09-11 DIAGNOSIS — M79.7 FIBROMYALGIA: ICD-10-CM

## 2024-09-11 DIAGNOSIS — E53.8 VITAMIN B 12 DEFICIENCY: ICD-10-CM

## 2024-09-11 DIAGNOSIS — E78.5 HYPERLIPIDEMIA, UNSPECIFIED HYPERLIPIDEMIA TYPE: ICD-10-CM

## 2024-09-11 DIAGNOSIS — K21.9 GASTROESOPHAGEAL REFLUX DISEASE, UNSPECIFIED WHETHER ESOPHAGITIS PRESENT: ICD-10-CM

## 2024-09-11 DIAGNOSIS — R73.9 HYPERGLYCEMIA: ICD-10-CM

## 2024-09-11 DIAGNOSIS — R53.81 MALAISE AND FATIGUE: ICD-10-CM

## 2024-09-11 DIAGNOSIS — E55.9 MILD VITAMIN D DEFICIENCY: ICD-10-CM

## 2024-09-11 DIAGNOSIS — E53.8 FOLIC ACID DEFICIENCY: ICD-10-CM

## 2024-09-11 DIAGNOSIS — R53.83 MALAISE AND FATIGUE: ICD-10-CM

## 2024-09-11 DIAGNOSIS — I25.10 CORONARY ARTERY DISEASE INVOLVING NATIVE CORONARY ARTERY OF NATIVE HEART, UNSPECIFIED WHETHER ANGINA PRESENT: Primary | ICD-10-CM

## 2024-09-11 DIAGNOSIS — M25.50 ARTHRALGIA, UNSPECIFIED JOINT: ICD-10-CM

## 2024-09-11 DIAGNOSIS — E78.2 MIXED HYPERLIPIDEMIA: ICD-10-CM

## 2024-09-11 LAB
25(OH)D3 SERPL-MCNC: 22 NG/ML (ref 30–100)
ALBUMIN SERPL BCP-MCNC: 3.5 G/DL (ref 3.4–5)
ALP SERPL-CCNC: 87 U/L (ref 45–117)
ALT SERPL W P-5'-P-CCNC: 21 U/L (ref 16–63)
ANION GAP SERPL CALC-SCNC: 13 MMOL/L (ref 10–20)
AST SERPL W P-5'-P-CCNC: 16 U/L (ref 15–37)
BASOPHILS # BLD AUTO: 0.02 X10*3/UL (ref 0.1–1.6)
BASOPHILS NFR BLD AUTO: 0.4 % (ref 0–0.3)
BILIRUB SERPL-MCNC: 0.7 MG/DL (ref 0.2–1)
BUN SERPL-MCNC: 10 MG/DL (ref 7–18)
CALCIUM SERPL-MCNC: 8.8 MG/DL (ref 8.5–10.1)
CCP IGG SERPL-ACNC: <1 U/ML
CENTROMERE B AB SER-ACNC: <0.2 AI
CHLORIDE SERPL-SCNC: 103 MMOL/L (ref 98–107)
CHOLEST SERPL-MCNC: 251 MG/DL (ref 0–199)
CHOLESTEROL/HDL RATIO: 7.6 (ref 4.2–7)
CHROMATIN AB SERPL-ACNC: <0.2 AI
CO2 SERPL-SCNC: 29 MMOL/L (ref 21–32)
CREAT SERPL-MCNC: 0.83 MG/DL (ref 0.6–1.1)
CRP SERPL-MCNC: 0.14 MG/DL
DSDNA AB SER-ACNC: <1 IU/ML
EGFRCR SERPLBLD CKD-EPI 2021: 69 ML/MIN/1.73M*2
ENA JO1 AB SER QL IA: <0.2 AI
ENA RNP AB SER IA-ACNC: <0.2 AI
ENA SCL70 AB SER QL IA: <0.2 AI
ENA SM AB SER IA-ACNC: <0.2 AI
ENA SM+RNP AB SER QL IA: <0.2 AI
ENA SS-A AB SER IA-ACNC: <0.2 AI
ENA SS-B AB SER IA-ACNC: <0.2 AI
EOSINOPHIL # BLD AUTO: 0.07 X10*3/UL (ref 0.04–0.5)
EOSINOPHIL NFR BLD AUTO: 1.16 % (ref 0.7–7)
ERYTHROCYTE [DISTWIDTH] IN BLOOD BY AUTOMATED COUNT: 15.1 % (ref 11.5–14.5)
ERYTHROCYTE [SEDIMENTATION RATE] IN BLOOD BY WESTERGREN METHOD: 18 MM/H (ref 0–30)
FOLATE SERPL-MCNC: 12.6 NG/ML (ref 8.6–58.9)
GLUCOSE SERPL-MCNC: 108 MG/DL (ref 74–100)
HBA1C MFR BLD: 5.4 %
HCT VFR BLD AUTO: 47.2 % (ref 36.6–46.6)
HDLC SERPL-MCNC: 33 MG/DL (ref 40–59)
HGB BLD-MCNC: 15.67 G/DL (ref 12–15.4)
IS PATIENT FASTING: YES
LDLC SERPL DIRECT ASSAY-MCNC: 136 MG/DL (ref 0–100)
LYMPHOCYTES # BLD AUTO: 1.8 X10*3/UL (ref 0–6)
LYMPHOCYTES NFR BLD AUTO: 29.01 % (ref 20.5–51.1)
MCH RBC QN AUTO: 29.7 PG (ref 26–32)
MCHC RBC AUTO-ENTMCNC: 33.2 G/DL (ref 31–38)
MCV RBC AUTO: 89.3 FL (ref 80–96)
MONOCYTES # BLD AUTO: 0.35 X10*3/UL (ref 1.6–24.9)
MONOCYTES NFR BLD AUTO: 5.57 % (ref 1.7–9.3)
NEUTROPHILS # BLD AUTO: 3.97 X10*3/UL (ref 1.4–6.5)
NEUTROPHILS NFR BLD AUTO: 63.86 % (ref 42.2–75.2)
PLATELET # BLD AUTO: 249.1 X10*3/UL (ref 150–450)
PMV BLD AUTO: 9.51 FL (ref 7.8–11)
POTASSIUM SERPL-SCNC: 4 MMOL/L (ref 3.5–5.1)
PROT SERPL-MCNC: 6.8 G/DL (ref 6.4–8.2)
RBC # BLD AUTO: 5.28 X10*6/UL (ref 3.9–5.3)
RHEUMATOID FACT SER NEPH-ACNC: <10 IU/ML (ref 0–15)
RIBOSOMAL P AB SER-ACNC: <0.2 AI
SODIUM SERPL-SCNC: 141 MMOL/L (ref 136–145)
TRIGL SERPL-MCNC: 354 MG/DL
TSH SERPL-ACNC: 1.26 MIU/L (ref 0.44–3.98)
URATE SERPL-MCNC: 5.2 MG/DL (ref 2.3–6.7)
VIT B12 SERPL-MCNC: 310 PG/ML (ref 193–986)
WBC # BLD AUTO: 6.21 X10*3/UL (ref 4.5–10.5)

## 2024-09-11 PROCEDURE — 1125F AMNT PAIN NOTED PAIN PRSNT: CPT | Performed by: INTERNAL MEDICINE

## 2024-09-11 PROCEDURE — 86431 RHEUMATOID FACTOR QUANT: CPT

## 2024-09-11 PROCEDURE — 84443 ASSAY THYROID STIM HORMONE: CPT | Performed by: INTERNAL MEDICINE

## 2024-09-11 PROCEDURE — 86200 CCP ANTIBODY: CPT

## 2024-09-11 PROCEDURE — 82607 VITAMIN B-12: CPT | Performed by: INTERNAL MEDICINE

## 2024-09-11 PROCEDURE — 3074F SYST BP LT 130 MM HG: CPT | Performed by: INTERNAL MEDICINE

## 2024-09-11 PROCEDURE — 80053 COMPREHEN METABOLIC PANEL: CPT | Performed by: INTERNAL MEDICINE

## 2024-09-11 PROCEDURE — 86225 DNA ANTIBODY NATIVE: CPT

## 2024-09-11 PROCEDURE — 1036F TOBACCO NON-USER: CPT | Performed by: INTERNAL MEDICINE

## 2024-09-11 PROCEDURE — 84550 ASSAY OF BLOOD/URIC ACID: CPT

## 2024-09-11 PROCEDURE — 85652 RBC SED RATE AUTOMATED: CPT

## 2024-09-11 PROCEDURE — 1160F RVW MEDS BY RX/DR IN RCRD: CPT | Performed by: INTERNAL MEDICINE

## 2024-09-11 PROCEDURE — 1157F ADVNC CARE PLAN IN RCRD: CPT | Performed by: INTERNAL MEDICINE

## 2024-09-11 PROCEDURE — 85025 COMPLETE CBC W/AUTO DIFF WBC: CPT

## 2024-09-11 PROCEDURE — 86038 ANTINUCLEAR ANTIBODIES: CPT

## 2024-09-11 PROCEDURE — 1159F MED LIST DOCD IN RCRD: CPT | Performed by: INTERNAL MEDICINE

## 2024-09-11 PROCEDURE — 1170F FXNL STATUS ASSESSED: CPT | Performed by: INTERNAL MEDICINE

## 2024-09-11 PROCEDURE — 86140 C-REACTIVE PROTEIN: CPT

## 2024-09-11 PROCEDURE — 99213 OFFICE O/P EST LOW 20 MIN: CPT | Performed by: INTERNAL MEDICINE

## 2024-09-11 PROCEDURE — 3078F DIAST BP <80 MM HG: CPT | Performed by: INTERNAL MEDICINE

## 2024-09-11 PROCEDURE — G0439 PPPS, SUBSEQ VISIT: HCPCS | Performed by: INTERNAL MEDICINE

## 2024-09-11 PROCEDURE — 86235 NUCLEAR ANTIGEN ANTIBODY: CPT

## 2024-09-11 PROCEDURE — 82306 VITAMIN D 25 HYDROXY: CPT | Performed by: INTERNAL MEDICINE

## 2024-09-11 PROCEDURE — 80061 LIPID PANEL: CPT | Performed by: INTERNAL MEDICINE

## 2024-09-11 PROCEDURE — 83036 HEMOGLOBIN GLYCOSYLATED A1C: CPT | Performed by: INTERNAL MEDICINE

## 2024-09-11 PROCEDURE — 82746 ASSAY OF FOLIC ACID SERUM: CPT | Performed by: INTERNAL MEDICINE

## 2024-09-11 RX ORDER — PANTOPRAZOLE SODIUM 40 MG/1
40 TABLET, DELAYED RELEASE ORAL DAILY
Qty: 90 TABLET | Refills: 3 | Status: SHIPPED | OUTPATIENT
Start: 2024-09-11 | End: 2024-12-10

## 2024-09-11 RX ORDER — GABAPENTIN 100 MG/1
100 CAPSULE ORAL 2 TIMES DAILY
Qty: 180 CAPSULE | Refills: 3 | Status: SHIPPED | OUTPATIENT
Start: 2024-09-11 | End: 2024-12-10

## 2024-09-11 ASSESSMENT — ENCOUNTER SYMPTOMS
PALPITATIONS: 0
DEPRESSION: 1
HEADACHES: 1
ARTHRALGIAS: 1
WEAKNESS: 1
OCCASIONAL FEELINGS OF UNSTEADINESS: 1
MYALGIAS: 1
DYSURIA: 0
BACK PAIN: 1
LOSS OF SENSATION IN FEET: 0
APPETITE CHANGE: 0
FATIGUE: 1
NERVOUS/ANXIOUS: 1
ACTIVITY CHANGE: 0

## 2024-09-11 ASSESSMENT — ANXIETY QUESTIONNAIRES
4. TROUBLE RELAXING: NOT AT ALL
6. BECOMING EASILY ANNOYED OR IRRITABLE: NOT AT ALL
5. BEING SO RESTLESS THAT IT IS HARD TO SIT STILL: NOT AT ALL
2. NOT BEING ABLE TO STOP OR CONTROL WORRYING: NOT AT ALL
IF YOU CHECKED OFF ANY PROBLEMS ON THIS QUESTIONNAIRE, HOW DIFFICULT HAVE THESE PROBLEMS MADE IT FOR YOU TO DO YOUR WORK, TAKE CARE OF THINGS AT HOME, OR GET ALONG WITH OTHER PEOPLE: SOMEWHAT DIFFICULT
3. WORRYING TOO MUCH ABOUT DIFFERENT THINGS: NOT AT ALL
GAD7 TOTAL SCORE: 0
1. FEELING NERVOUS, ANXIOUS, OR ON EDGE: NOT AT ALL
7. FEELING AFRAID AS IF SOMETHING AWFUL MIGHT HAPPEN: NOT AT ALL

## 2024-09-11 ASSESSMENT — ACTIVITIES OF DAILY LIVING (ADL)
TAKING MEDICATION: TOTAL CARE
GROOMING: INDEPENDENT
HEARING - LEFT EAR: FUNCTIONAL
MANAGING FINANCES: TOTAL CARE
NEEDS ASSISTANCE WITH FOOD: INDEPENDENT
JUDGMENT_ADEQUATE_SAFELY_COMPLETE_DAILY_ACTIVITIES: YES
ADEQUATE_TO_COMPLETE_ADL: YES
STIL DRIVING: NO
USING TELEPHONE: NEEDS ASSISTANCE
FEEDING YOURSELF: INDEPENDENT
DRESSING YOURSELF: INDEPENDENT
TOILETING: INDEPENDENT
HEARING - RIGHT EAR: FUNCTIONAL
DOING HOUSEWORK: TOTAL CARE
PILL BOX USED: YES
GROCERY SHOPPING: TOTAL CARE
PREPARING MEALS: TOTAL CARE
PATIENT'S MEMORY ADEQUATE TO SAFELY COMPLETE DAILY ACTIVITIES?: YES
BATHING: NEEDS ASSISTANCE
EATING: INDEPENDENT
WALKS IN HOME: INDEPENDENT
ASSISTIVE_DEVICE: CANE;SHOWER CHAIR;WALKER
USING TRANSPORTATION: TOTAL CARE

## 2024-09-11 ASSESSMENT — PATIENT HEALTH QUESTIONNAIRE - PHQ9
SUM OF ALL RESPONSES TO PHQ9 QUESTIONS 1 AND 2: 2
1. LITTLE INTEREST OR PLEASURE IN DOING THINGS: NOT AT ALL
2. FEELING DOWN, DEPRESSED OR HOPELESS: NOT AT ALL
SUM OF ALL RESPONSES TO PHQ9 QUESTIONS 1 AND 2: 0
10. IF YOU CHECKED OFF ANY PROBLEMS, HOW DIFFICULT HAVE THESE PROBLEMS MADE IT FOR YOU TO DO YOUR WORK, TAKE CARE OF THINGS AT HOME, OR GET ALONG WITH OTHER PEOPLE: SOMEWHAT DIFFICULT
10. IF YOU CHECKED OFF ANY PROBLEMS, HOW DIFFICULT HAVE THESE PROBLEMS MADE IT FOR YOU TO DO YOUR WORK, TAKE CARE OF THINGS AT HOME, OR GET ALONG WITH OTHER PEOPLE: SOMEWHAT DIFFICULT
2. FEELING DOWN, DEPRESSED OR HOPELESS: SEVERAL DAYS
1. LITTLE INTEREST OR PLEASURE IN DOING THINGS: SEVERAL DAYS

## 2024-09-11 ASSESSMENT — COLUMBIA-SUICIDE SEVERITY RATING SCALE - C-SSRS
2. HAVE YOU ACTUALLY HAD ANY THOUGHTS OF KILLING YOURSELF?: NO
6. HAVE YOU EVER DONE ANYTHING, STARTED TO DO ANYTHING, OR PREPARED TO DO ANYTHING TO END YOUR LIFE?: NO
2. HAVE YOU ACTUALLY HAD ANY THOUGHTS OF KILLING YOURSELF?: NO
1. IN THE PAST MONTH, HAVE YOU WISHED YOU WERE DEAD OR WISHED YOU COULD GO TO SLEEP AND NOT WAKE UP?: NO
1. IN THE PAST MONTH, HAVE YOU WISHED YOU WERE DEAD OR WISHED YOU COULD GO TO SLEEP AND NOT WAKE UP?: NO
6. HAVE YOU EVER DONE ANYTHING, STARTED TO DO ANYTHING, OR PREPARED TO DO ANYTHING TO END YOUR LIFE?: NO

## 2024-09-11 ASSESSMENT — GERIATRIC MINI NUTRITIONAL ASSESSMENT (MNA)
B WEIGHT LOSS DURING THE LAST 3 MONTHS: NO WEIGHT LOSS
A HAS FOOD INTAKE DECLINED OVER THE PAST 3 MONTHS DUE TO LOSS OF APPETITE, DIGESTIVE PROBLEMS, CHEWING OR SWALLOWING DIFFICULTIES?: NO DECREASE IN FOOD INTAKE
C GENERAL MOBILITY: GOES OUT

## 2024-09-11 ASSESSMENT — PAIN SCALES - GENERAL: PAINLEVEL: 9

## 2024-09-11 NOTE — PROGRESS NOTES
Subjective   Patient ID: Ragini Butler is a 84 y.o. female who presents for  Medicare Wellness annual exam.  She is here for .  Patient is in the room with her granddaughter present.    HPI     This is an 83 years old [place]ib[place]ian speaking lady with medical history significant for hypertension, hyper lipidemia, vitamin D. deficiency, degenerative joint disease, low back pain, severe depression with anxiety, headaches, arthralgia, insomnia, memory loss, low back pain, fibromyalgia, gastroesophageal reflux disease, syncopal episode, H of R sided pleural effusion, s/p hospitalization for Acute NSTMI 1/2020 due to occlusion of LAD with NATALIE placement , s/p bilateral cataract surgery, S/p hospitalization for COVID -19 illness 7/6/2022 to 7/9/2022.  Patient is presented for Medicare wellness annual exam.  History is obtained with help of her granddaughter.  Has been having headaches.  According to her, patient has been weak.  Has dizziness, lightheadedness episodes.  Has low back pain, arthralgia.  Has insomnia.  Taking medications as directed.  Has occasional chest pain.  Stated, that blood pressure is stable now.   Patient has issues with memory.  Forgetful.     Has occasional constipations.  Using bedside commode.  Discussed with patient about exercise, not so active.     Has been having low energy, fatigue and malaise.  Stated, that has been having some chest discomfort.  Followed by Dr Kamari Rogers at [facility].     Followed by the psychiatry .    Review of Systems   Constitutional:  Positive for fatigue. Negative for activity change and appetite change.   Cardiovascular:  Negative for chest pain, palpitations and leg swelling.   Genitourinary:  Negative for dysuria.   Musculoskeletal:  Positive for arthralgias, back pain and myalgias.   Neurological:  Positive for weakness and headaches.   Psychiatric/Behavioral:  The patient is nervous/anxious.        Objective   /78   Pulse 64   Temp 36.4 °C (97.5 °F) (Temporal)    Resp 16   Ht 1.524 m (5')   Wt 64 kg (141 lb)   BMI 27.54 kg/m²     Physical Exam  Cardiovascular:      Rate and Rhythm: Normal rate.   Abdominal:      Palpations: Abdomen is soft.   Skin:     General: Skin is warm.   Neurological:      Mental Status: She is alert. Mental status is at baseline.   Psychiatric:         Mood and Affect: Mood normal.         Assessment/Plan   Problem List Items Addressed This Visit             ICD-10-CM    CAD (coronary artery disease) - Primary I25.10    Esophageal reflux K21.9    Relevant Medications    pantoprazole (ProtoNix) 40 mg EC tablet    Fibromyalgia M79.7    Relevant Medications    gabapentin (Neurontin) 100 mg capsule    Hyperglycemia R73.9    Relevant Orders    Hemoglobin A1C (Completed)    Mild vitamin D deficiency E55.9    Relevant Orders    Vitamin D 25-Hydroxy,Total (for eval of Vitamin D levels)    Mixed hyperlipidemia E78.2    Vitamin B 12 deficiency E53.8    Relevant Orders    Vitamin B12     Other Visit Diagnoses         Codes    Folic acid deficiency     E53.8    Relevant Orders    Folate    Malaise and fatigue     R53.81, R53.83    Relevant Orders    CBC w/5 Part Differential, [facility]    Thyroid Stimulating Hormone    Arthralgia, unspecified joint     M25.50    Relevant Orders    TIFFANI + MART Panel    Arthritis Panel (CMS)    Citrulline Antibody, IgG    C-Reactive Protein    Hyperlipidemia, unspecified hyperlipidemia type     E78.5    Relevant Orders    Comprehensive Metabolic Panel    Lipid Panel            Headaches.  Take gabapentin 100 mg twice a day.  Last  MRI 2022, has meningioma.  Not interested to repeat.     - Stable orbital meningioma.  Stable.  Due to MRI.     Anxiety, depression.  Taking  Sertraline, Xanax.  Follow-up with psychiatry.  Stable.     Fibromyalgia.  Generalized pain.  Patient was advised about importance of exercise, moving.     -H of COVID -19 illness, s/p hospitalization 7/6/2022 to 7/9/2022.  Patient was admitted to the main  hospital with acute respiratory failure due to COVID-19, episodes of diarrhea, shortness of breath, UTI, acute kidney injury, dehydrated.  Patient was treated with remdesivir, dexamethasone.  She is unvaccinated.     -Degenerative joint disease.  Low back pain.  Take Tylenol as needed.  Continue to take gabapentin 100 mg twice a day.     -HTN.  WEll controlled now.  Currently taking amlodipine, losartan metoprolol.  Avoid salt, exercise.     -CAD.  H of Acute NSTEMI due to 90 % LAD occlusion.  Followed by Dr Padilla at [facility].  Last 2D Echo 1/2020 normal left ventricular systolic function..  Grade 1 left ventricular diastolic dysfunction.  Cardiac cath 1/2020 severe stenosis of the proximal LAD.  Mild diffuse coronary disease of LCx and RCA.  S/p NATALIE placement 1/2020.  Stable now, no chest pain.  Optimize medical therapy.     - Ascending aortic aneurysm.  Surveillance CT scan.     Mild Vit D deficiency.  Take Vit D as directed.      - Vitamin B-12 deficiency.  Continue to take vitamin B 12 monthly.     - Health maintenance.  Medicare wellness exam is today.  Declined vaccinations.     -Overweight with BMI 27.54  Keep your weight, continue well-balanced diet.  Ideal BMI is 25.     -Hyperglycemia.  We will obtain hemoglobin A1c.  Avoid sweets, carbohydrates.

## 2024-09-12 LAB — ANA SER QL HEP2 SUBST: NEGATIVE

## 2024-09-13 DIAGNOSIS — E55.9 MILD VITAMIN D DEFICIENCY: Primary | ICD-10-CM

## 2024-09-13 DIAGNOSIS — E78.2 MIXED HYPERLIPIDEMIA: ICD-10-CM

## 2024-09-13 RX ORDER — ROSUVASTATIN CALCIUM 20 MG/1
20 TABLET, COATED ORAL DAILY
Qty: 30 TABLET | Refills: 11 | Status: SHIPPED | OUTPATIENT
Start: 2024-09-13 | End: 2025-09-13

## 2024-09-13 RX ORDER — ERGOCALCIFEROL 1.25 MG/1
50000 CAPSULE ORAL WEEKLY
Qty: 4 CAPSULE | Refills: 2 | Status: SHIPPED | OUTPATIENT
Start: 2024-09-13 | End: 2024-12-06

## 2024-10-04 PROCEDURE — G0179 MD RECERTIFICATION HHA PT: HCPCS | Performed by: INTERNAL MEDICINE

## 2024-12-03 PROCEDURE — G0179 MD RECERTIFICATION HHA PT: HCPCS | Performed by: INTERNAL MEDICINE

## 2025-02-01 PROCEDURE — G0179 MD RECERTIFICATION HHA PT: HCPCS | Performed by: INTERNAL MEDICINE

## 2025-04-02 PROCEDURE — G0179 MD RECERTIFICATION HHA PT: HCPCS | Performed by: INTERNAL MEDICINE

## 2025-05-13 ENCOUNTER — APPOINTMENT (OUTPATIENT)
Dept: PRIMARY CARE | Facility: CLINIC | Age: 85
End: 2025-05-13
Payer: COMMERCIAL

## 2025-05-14 ENCOUNTER — APPOINTMENT (OUTPATIENT)
Dept: PRIMARY CARE | Facility: CLINIC | Age: 85
End: 2025-05-14
Payer: COMMERCIAL

## 2025-06-01 PROCEDURE — G0179 MD RECERTIFICATION HHA PT: HCPCS | Performed by: INTERNAL MEDICINE

## 2025-06-05 DIAGNOSIS — I10 PRIMARY HYPERTENSION: ICD-10-CM

## 2025-06-05 DIAGNOSIS — I25.10 CORONARY ARTERY DISEASE INVOLVING NATIVE CORONARY ARTERY OF NATIVE HEART, UNSPECIFIED WHETHER ANGINA PRESENT: ICD-10-CM

## 2025-06-05 RX ORDER — METOPROLOL TARTRATE 25 MG/1
25 TABLET, FILM COATED ORAL 2 TIMES DAILY
Qty: 180 TABLET | Refills: 2 | Status: SHIPPED | OUTPATIENT
Start: 2025-06-05

## 2025-06-05 RX ORDER — CLOPIDOGREL BISULFATE 75 MG/1
75 TABLET ORAL DAILY
Qty: 90 TABLET | Refills: 2 | Status: SHIPPED | OUTPATIENT
Start: 2025-06-05

## 2025-06-13 ENCOUNTER — APPOINTMENT (OUTPATIENT)
Dept: PRIMARY CARE | Facility: CLINIC | Age: 85
End: 2025-06-13
Payer: COMMERCIAL

## 2025-06-19 ENCOUNTER — OFFICE VISIT (OUTPATIENT)
Dept: PRIMARY CARE | Facility: CLINIC | Age: 85
End: 2025-06-19
Payer: COMMERCIAL

## 2025-06-19 VITALS
BODY MASS INDEX: 28.66 KG/M2 | HEIGHT: 60 IN | DIASTOLIC BLOOD PRESSURE: 78 MMHG | HEART RATE: 62 BPM | WEIGHT: 146 LBS | SYSTOLIC BLOOD PRESSURE: 126 MMHG | TEMPERATURE: 98.1 F | RESPIRATION RATE: 16 BRPM

## 2025-06-19 DIAGNOSIS — R73.9 HYPERGLYCEMIA: Primary | ICD-10-CM

## 2025-06-19 DIAGNOSIS — E53.8 VITAMIN B 12 DEFICIENCY: ICD-10-CM

## 2025-06-19 DIAGNOSIS — E78.2 MIXED HYPERLIPIDEMIA: ICD-10-CM

## 2025-06-19 DIAGNOSIS — D50.9 IRON DEFICIENCY ANEMIA, UNSPECIFIED IRON DEFICIENCY ANEMIA TYPE: ICD-10-CM

## 2025-06-19 DIAGNOSIS — E78.5 HYPERLIPIDEMIA, UNSPECIFIED HYPERLIPIDEMIA TYPE: ICD-10-CM

## 2025-06-19 DIAGNOSIS — D32.9 BENIGN NEOPLASM OF MENINGES, UNSPECIFIED: ICD-10-CM

## 2025-06-19 DIAGNOSIS — E55.9 MILD VITAMIN D DEFICIENCY: ICD-10-CM

## 2025-06-19 DIAGNOSIS — R53.81 MALAISE: ICD-10-CM

## 2025-06-19 LAB
25(OH)D3 SERPL-MCNC: 22 NG/ML (ref 30–100)
ALBUMIN SERPL BCP-MCNC: 3.9 G/DL (ref 3.4–5)
ALP SERPL-CCNC: 83 U/L (ref 45–117)
ALT SERPL W P-5'-P-CCNC: 29 U/L (ref 16–63)
ANION GAP SERPL CALC-SCNC: 15 MMOL/L (ref 10–20)
AST SERPL W P-5'-P-CCNC: 19 U/L (ref 15–37)
BASOPHILS # BLD AUTO: 0.02 X10*3/UL (ref 0.1–1.6)
BASOPHILS NFR BLD AUTO: 0.33 % (ref 0–0.3)
BILIRUB SERPL-MCNC: 0.8 MG/DL (ref 0.2–1)
BUN SERPL-MCNC: 11 MG/DL (ref 7–18)
CALCIUM SERPL-MCNC: 9.4 MG/DL (ref 8.5–10.1)
CHLORIDE SERPL-SCNC: 104 MMOL/L (ref 98–107)
CHOLEST SERPL-MCNC: 263 MG/DL (ref 0–199)
CHOLESTEROL/HDL RATIO: 8 (ref 4.2–7)
CO2 SERPL-SCNC: 27 MMOL/L (ref 21–32)
CREAT SERPL-MCNC: 0.89 MG/DL (ref 0.6–1.1)
EGFRCR SERPLBLD CKD-EPI 2021: 63 ML/MIN/1.73M*2
EOSINOPHIL # BLD AUTO: 0.06 X10*3/UL (ref 0.04–0.5)
EOSINOPHIL NFR BLD AUTO: 0.88 % (ref 0.7–7)
ERYTHROCYTE [DISTWIDTH] IN BLOOD BY AUTOMATED COUNT: 15 % (ref 11.5–14.5)
GLUCOSE SERPL-MCNC: 103 MG/DL (ref 74–100)
HBA1C MFR BLD: 5.6 %
HCT VFR BLD AUTO: 46.7 % (ref 36.6–46.6)
HDLC SERPL-MCNC: 33 MG/DL (ref 40–59)
HGB BLD-MCNC: 16.19 G/DL (ref 12–15.4)
IS PATIENT FASTING: YES
LDLC SERPL DIRECT ASSAY-MCNC: 155 MG/DL (ref 0–100)
LYMPHOCYTES # BLD AUTO: 2.31 X10*3/UL (ref 0–6)
LYMPHOCYTES NFR BLD AUTO: 36.88 % (ref 20.5–51.1)
MCH RBC QN AUTO: 31.1 PG (ref 26–32)
MCHC RBC AUTO-ENTMCNC: 34.7 G/DL (ref 31–38)
MCV RBC AUTO: 89.9 FL (ref 80–96)
MONOCYTES # BLD AUTO: 0.43 X10*3/UL (ref 1.6–24.9)
MONOCYTES NFR BLD AUTO: 6.92 % (ref 1.7–9.3)
NEUTROPHILS # BLD AUTO: 3.44 X10*3/UL (ref 1.4–6.5)
NEUTROPHILS NFR BLD AUTO: 54.99 % (ref 42.2–75.2)
PLATELET # BLD AUTO: 264.1 X10*3/UL (ref 150–450)
PMV BLD AUTO: 9.52 FL (ref 7.8–11)
POTASSIUM SERPL-SCNC: 4.2 MMOL/L (ref 3.5–5.1)
PROT SERPL-MCNC: 6.5 G/DL (ref 6.4–8.2)
RBC # BLD AUTO: 5.2 X10*6/UL (ref 3.9–5.3)
SODIUM SERPL-SCNC: 142 MMOL/L (ref 136–145)
TRIGL SERPL-MCNC: 335 MG/DL
TSH SERPL-ACNC: 0.97 MIU/L (ref 0.44–3.98)
VIT B12 SERPL-MCNC: 236 PG/ML (ref 193–986)
WBC # BLD AUTO: 6.25 X10*3/UL (ref 4.5–10.5)

## 2025-06-19 PROCEDURE — 1157F ADVNC CARE PLAN IN RCRD: CPT | Performed by: INTERNAL MEDICINE

## 2025-06-19 PROCEDURE — 1036F TOBACCO NON-USER: CPT | Performed by: INTERNAL MEDICINE

## 2025-06-19 PROCEDURE — 84443 ASSAY THYROID STIM HORMONE: CPT | Performed by: INTERNAL MEDICINE

## 2025-06-19 PROCEDURE — 3078F DIAST BP <80 MM HG: CPT | Performed by: INTERNAL MEDICINE

## 2025-06-19 PROCEDURE — G2211 COMPLEX E/M VISIT ADD ON: HCPCS | Performed by: INTERNAL MEDICINE

## 2025-06-19 PROCEDURE — 82607 VITAMIN B-12: CPT | Performed by: INTERNAL MEDICINE

## 2025-06-19 PROCEDURE — 3074F SYST BP LT 130 MM HG: CPT | Performed by: INTERNAL MEDICINE

## 2025-06-19 PROCEDURE — 83036 HEMOGLOBIN GLYCOSYLATED A1C: CPT | Performed by: INTERNAL MEDICINE

## 2025-06-19 PROCEDURE — 80053 COMPREHEN METABOLIC PANEL: CPT | Performed by: INTERNAL MEDICINE

## 2025-06-19 PROCEDURE — 82306 VITAMIN D 25 HYDROXY: CPT | Performed by: INTERNAL MEDICINE

## 2025-06-19 PROCEDURE — 99213 OFFICE O/P EST LOW 20 MIN: CPT | Performed by: INTERNAL MEDICINE

## 2025-06-19 PROCEDURE — 1159F MED LIST DOCD IN RCRD: CPT | Performed by: INTERNAL MEDICINE

## 2025-06-19 ASSESSMENT — ENCOUNTER SYMPTOMS
OCCASIONAL FEELINGS OF UNSTEADINESS: 0
LOSS OF SENSATION IN FEET: 0
DEPRESSION: 0
ARTHRALGIAS: 1
NERVOUS/ANXIOUS: 1
BACK PAIN: 1
ACTIVITY CHANGE: 1
FATIGUE: 1
LIGHT-HEADEDNESS: 1
SLEEP DISTURBANCE: 1
HEADACHES: 1

## 2025-06-19 NOTE — PROGRESS NOTES
Subjective   Patient ID: Ragini Butler is a 85 y.o. female who presents for  follow up.  She is weak, has headaches.  Taking medications as directed.  Patient is here with her granddaughter in the room.    HPI     This is an 85 years old Swazi speaking lady with medical history significant for hypertension, hyper lipidemia, vitamin D. deficiency, degenerative joint disease, low back pain, severe depression with anxiety, headaches, arthralgia, insomnia, memory loss, low back pain, fibromyalgia, gastroesophageal reflux disease, syncopal episode, H of R sided pleural effusion, s/p hospitalization for Acute NSTMI 1/2020 due to occlusion of LAD with NATALIE placement , s/p bilateral cataract surgery, S/p hospitalization for COVID -19 illness 7/6/2022 to 7/9/2022.  Patient is presented for  follow up.  History is obtained with help of her granddaughter.  Weak.  Has been having headaches.     Has dizziness, lightheadedness episodes.  Has low back pain, arthralgia.  Has insomnia.  Taking medications as directed.  Has occasional chest pain.  Stated, that blood pressure is stable now.   Patient has issues with memory.  Forgetful.     Has occasional constipations.  Using bedside commode.  Discussed with patient about exercise, not so active.     Has been having low energy, fatigue and malaise.  Stated, that has been having some chest discomfort.  Followed by Dr Kamari Rogers at [facility].        Review of Systems   Constitutional:  Positive for activity change and fatigue.   Musculoskeletal:  Positive for arthralgias, back pain and gait problem.   Neurological:  Positive for light-headedness and headaches.   Psychiatric/Behavioral:  Positive for sleep disturbance. The patient is nervous/anxious.        Objective   /78   Pulse 62   Temp 36.7 °C (98.1 °F) (Temporal)   Resp 16   Ht 1.524 m (5')   Wt 66.2 kg (146 lb)   BMI 28.51 kg/m²     Physical Exam  HENT:      Head: Normocephalic.   Cardiovascular:      Rate and Rhythm:  Normal rate.   Musculoskeletal:         General: Tenderness present.   Skin:     General: Skin is warm.   Neurological:      Mental Status: She is alert.         Assessment/Plan   Problem List Items Addressed This Visit           ICD-10-CM    Anemia D64.9    Relevant Orders    CBC w/5 Part Differential, [facility] (Completed)    Ferritin    Iron and TIBC    Hyperglycemia - Primary R73.9    Relevant Orders    Hemoglobin A1C (Completed)    Malaise R53.81    Relevant Orders    Thyroid Stimulating Hormone (Completed)    Mild vitamin D deficiency E55.9    Relevant Orders    Vitamin D 25-Hydroxy,Total (for eval of Vitamin D levels) (Completed)    Mixed hyperlipidemia E78.2    Vitamin B 12 deficiency E53.8    Relevant Orders    Vitamin B12 (Completed)     Other Visit Diagnoses         Codes      Hyperlipidemia, unspecified hyperlipidemia type     E78.5    Relevant Orders    Comprehensive Metabolic Panel (Completed)    Lipid Panel (Completed)          -HTN.  WEll controlled now.  Currently taking amlodipine, losartan metoprolol.  Avoid salt, exercise.     -CAD.  H of Acute NSTEMI due to 90 % LAD occlusion.  Followed by Dr Padilla at [facility].  Last 2D Echo 1/2020 normal left ventricular systolic function..  Grade 1 left ventricular diastolic dysfunction.  Cardiac cath 1/2020 severe stenosis of the proximal LAD.  Mild diffuse coronary disease of LCx and RCA.  S/p NATALIE placement 1/2020.  Stable now, no chest pain.  Optimize medical therapy.     - Ascending aortic aneurysm.  Surveillance CT scan.     Headaches.  Take gabapentin 100 mg twice a day.  Last  MRI 2022, has meningioma.  Not interested to repeat.     - Stable orbital meningioma.  Stable.  Due to MRI.     Anxiety, depression.  Taking  Sertraline, Xanax.  Follow-up with psychiatry.  Stable.     Fibromyalgia.  Generalized pain.  Patient was advised about importance of exercise, moving.     -H of COVID -19 illness, s/p hospitalization 7/6/2022 to 7/9/2022.  Patient was  admitted to the main hospital with acute respiratory failure due to COVID-19, episodes of diarrhea, shortness of breath, UTI, acute kidney injury, dehydrated.  Patient was treated with remdesivir, dexamethasone.  She is unvaccinated.     -Degenerative joint disease.  Low back pain.  Take Tylenol as needed.  Continue to take gabapentin 100 mg twice a day.        Mild Vit D deficiency.  Take Vit D as directed.      - Vitamin B-12 deficiency.  Continue to take vitamin B 12 monthly.     - Health maintenance.  Medicare wellness exam is today.  Declined vaccinations.     -Overweight with BMI 28.51  Keep your weight, continue well-balanced diet.  Ideal BMI is 25.     -Hyperglycemia.  We will obtain hemoglobin A1c.  Avoid sweets, carbohydrates.

## 2025-06-20 DIAGNOSIS — D53.1 COMBINED B12 AND FOLATE DEFICIENCY ANEMIA: Primary | ICD-10-CM

## 2025-06-20 DIAGNOSIS — E55.9 MILD VITAMIN D DEFICIENCY: ICD-10-CM

## 2025-06-20 DIAGNOSIS — E78.2 MIXED HYPERLIPIDEMIA: ICD-10-CM

## 2025-06-20 LAB
FERRITIN SERPL-MCNC: 70 NG/ML (ref 16–288)
IRON SATN MFR SERPL: 41 % (CALC) (ref 16–45)
IRON SERPL-MCNC: 137 MCG/DL (ref 45–160)
TIBC SERPL-MCNC: 335 MCG/DL (CALC) (ref 250–450)

## 2025-06-20 RX ORDER — ERGOCALCIFEROL 1.25 MG/1
1.25 CAPSULE ORAL WEEKLY
Qty: 12 CAPSULE | Refills: 2 | Status: SHIPPED | OUTPATIENT
Start: 2025-06-20 | End: 2025-09-12

## 2025-06-20 RX ORDER — LANOLIN ALCOHOL/MO/W.PET/CERES
1000 CREAM (GRAM) TOPICAL DAILY
Qty: 90 TABLET | Refills: 3 | Status: SHIPPED | OUTPATIENT
Start: 2025-06-20 | End: 2025-09-18

## 2025-06-20 RX ORDER — ROSUVASTATIN CALCIUM 20 MG/1
20 TABLET, COATED ORAL DAILY
Qty: 90 TABLET | Refills: 3 | Status: SHIPPED | OUTPATIENT
Start: 2025-06-20 | End: 2025-09-18

## 2025-07-23 DIAGNOSIS — I10 PRIMARY HYPERTENSION: ICD-10-CM

## 2025-07-23 RX ORDER — AMLODIPINE BESYLATE 2.5 MG/1
2.5 TABLET ORAL NIGHTLY
Qty: 90 TABLET | Refills: 0 | Status: SHIPPED | OUTPATIENT
Start: 2025-07-23